# Patient Record
Sex: FEMALE | Race: WHITE | NOT HISPANIC OR LATINO | Employment: STUDENT | URBAN - METROPOLITAN AREA
[De-identification: names, ages, dates, MRNs, and addresses within clinical notes are randomized per-mention and may not be internally consistent; named-entity substitution may affect disease eponyms.]

---

## 2024-05-03 ENCOUNTER — OFFICE VISIT (OUTPATIENT)
Dept: FAMILY MEDICINE | Facility: CLINIC | Age: 24
End: 2024-05-03
Payer: COMMERCIAL

## 2024-05-03 ENCOUNTER — LAB (OUTPATIENT)
Dept: LAB | Facility: CLINIC | Age: 24
End: 2024-05-03
Payer: COMMERCIAL

## 2024-05-03 VITALS
HEIGHT: 61 IN | HEART RATE: 67 BPM | SYSTOLIC BLOOD PRESSURE: 119 MMHG | DIASTOLIC BLOOD PRESSURE: 78 MMHG | BODY MASS INDEX: 21.71 KG/M2 | WEIGHT: 115 LBS

## 2024-05-03 DIAGNOSIS — S83.004S PATELLAR DISLOCATION, RIGHT, SEQUELA: ICD-10-CM

## 2024-05-03 DIAGNOSIS — Z02.5 SPORTS PHYSICAL: Primary | ICD-10-CM

## 2024-05-03 DIAGNOSIS — N91.1 AMENORRHEA, SECONDARY: ICD-10-CM

## 2024-05-03 DIAGNOSIS — Z13.0 ENCOUNTER FOR SICKLE-CELL SCREENING: ICD-10-CM

## 2024-05-03 DIAGNOSIS — Z13.6 SCREENING FOR HEART DISEASE: ICD-10-CM

## 2024-05-03 PROCEDURE — 83020 HEMOGLOBIN ELECTROPHORESIS: CPT | Performed by: PREVENTIVE MEDICINE

## 2024-05-03 PROCEDURE — 36415 COLL VENOUS BLD VENIPUNCTURE: CPT | Performed by: PATHOLOGY

## 2024-05-03 PROCEDURE — 99000 SPECIMEN HANDLING OFFICE-LAB: CPT | Performed by: PATHOLOGY

## 2024-05-03 PROCEDURE — 93000 ELECTROCARDIOGRAM COMPLETE: CPT | Performed by: INTERNAL MEDICINE

## 2024-05-03 RX ORDER — NORETHINDRONE ACETATE AND ETHINYL ESTRADIOL AND FERROUS FUMARATE 5-7-9-7
1 KIT ORAL DAILY
COMMUNITY

## 2024-05-03 ASSESSMENT — ANXIETY QUESTIONNAIRES
3. WORRYING TOO MUCH ABOUT DIFFERENT THINGS: NOT AT ALL
7. FEELING AFRAID AS IF SOMETHING AWFUL MIGHT HAPPEN: NOT AT ALL
GAD7 TOTAL SCORE: 0
GAD7 TOTAL SCORE: 0
5. BEING SO RESTLESS THAT IT IS HARD TO SIT STILL: NOT AT ALL
2. NOT BEING ABLE TO STOP OR CONTROL WORRYING: NOT AT ALL
1. FEELING NERVOUS, ANXIOUS, OR ON EDGE: NOT AT ALL
6. BECOMING EASILY ANNOYED OR IRRITABLE: NOT AT ALL

## 2024-05-03 ASSESSMENT — PATIENT HEALTH QUESTIONNAIRE - PHQ9: 5. POOR APPETITE OR OVEREATING: NOT AT ALL

## 2024-05-03 NOTE — PROGRESS NOTES
"Dayana Rider presents for PPE for cheer  History of patellar dislocation in middle school of right knee?  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  Sport(s): Cheerleading    Vision: Right Eye: 20/20 Left Eye: 20/20 Both Eyes: 20/20  Correction: glasses and contacts  Pupils: equal    Sickle Cell Trait: Discussed  Concussions: Concussion fact sheet reviewed. Student Athlete gave written and verbal agreement to report any suspected concussions.    General/Medical  Eyes/Vision: Normal  Ears/Hearing: Normal  Nose: Normal  Mouth/Dental: Normal  Throat: Normal  Thyroid: Normal  Lymph Nodes: Normal  Lungs: Normal  Abdomen: Normal    Skin: Normal    Musculoskeletal/Orthopaedic  Neck/Cervical: Normal  Thoracic/Lumbar: Normal  Shoulder/Upper Arm: Normal  Elbow/Forearm: Normal  Wrist/Hand/Fingers: Normal  Hip/Thigh: Normal  Knee/Patella: Normal, except for crepitus, no pain  Lower Leg/Ankles: Normal  Foot/Toes: Normal    Cardiovascular Screening  RRR  Heart Murmur:No Grade: NA  Symmetric Femoral pulses: Yes    Stigmata of Marfan's Syndrome - if appropriate:  Not applicable    COMMENTS, RECOMMENDATIONS and PARTICIPATION STATUS  Cleared after completing evaluation/rehabilitation for: followup with OB/Gyn and then with us for review of lab studies  And I will order DEXA scan and bilateral knee xrays  Reviewed EKG, no concerns  Sickle cell negative  Discussed plan with athlete and ATC  Dr Robertson    "

## 2024-05-03 NOTE — LETTER
"  5/3/2024      RE: Dayana Rider  [address] [place] [address]     Dear Colleague,    Thank you for referring your patient, Dayana Rider, to the [facility]. Please see a copy of my visit note below.    Dayana Rider presents for PPE for cheer  History of patellar dislocation in middle school of right knee?  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  Sport(s): Cheerleading    Vision: Right Eye: 20/20 Left Eye: 20/20 Both Eyes: 20/20  Correction: glasses and contacts  Pupils: equal    Sickle Cell Trait: Discussed  Concussions: Concussion fact sheet reviewed. Student Athlete gave written and verbal agreement to report any suspected concussions.    General/Medical  Eyes/Vision: Normal  Ears/Hearing: Normal  Nose: Normal  Mouth/Dental: Normal  Throat: Normal  Thyroid: Normal  Lymph Nodes: Normal  Lungs: Normal  Abdomen: Normal    Skin: Normal    Musculoskeletal/Orthopaedic  Neck/Cervical: Normal  Thoracic/Lumbar: Normal  Shoulder/Upper Arm: Normal  Elbow/Forearm: Normal  Wrist/Hand/Fingers: Normal  Hip/Thigh: Normal  Knee/Patella: Normal, except for crepitus, no pain  Lower Leg/Ankles: Normal  Foot/Toes: Normal    Cardiovascular Screening  RRR  Heart Murmur:No Grade: NA  Symmetric Femoral pulses: Yes    Stigmata of Marfan's Syndrome - if appropriate:  Not applicable    COMMENTS, RECOMMENDATIONS and PARTICIPATION STATUS  Cleared after completing evaluation/rehabilitation for: followup with OB/Gyn and then with us for review of lab studies  And I will order DEXA scan and bilateral knee " xrays  Reviewed EKG, no concerns  Sickle cell negative  Discussed plan with athlete and ATC  Dr Robertson        Again, thank you for allowing me to participate in the care of your patient.      Sincerely,    Kieran Robertson MD

## 2024-05-06 LAB
ATRIAL RATE - MUSE: 58 BPM
DIASTOLIC BLOOD PRESSURE - MUSE: NORMAL MMHG
INTERPRETATION ECG - MUSE: NORMAL
P AXIS - MUSE: 52 DEGREES
PR INTERVAL - MUSE: 120 MS
QRS DURATION - MUSE: 80 MS
QT - MUSE: 452 MS
QTC - MUSE: 443 MS
R AXIS - MUSE: 57 DEGREES
SYSTOLIC BLOOD PRESSURE - MUSE: NORMAL MMHG
T AXIS - MUSE: 45 DEGREES
VENTRICULAR RATE- MUSE: 58 BPM

## 2024-05-07 LAB — HGB S BLD QL: NEGATIVE

## 2024-05-09 DIAGNOSIS — N91.2 AMENORRHEA: Primary | ICD-10-CM

## 2024-06-13 ENCOUNTER — ANCILLARY PROCEDURE (OUTPATIENT)
Dept: BONE DENSITY | Facility: CLINIC | Age: 24
End: 2024-06-13
Attending: PREVENTIVE MEDICINE
Payer: COMMERCIAL

## 2024-06-13 DIAGNOSIS — N91.2 AMENORRHEA: ICD-10-CM

## 2024-06-13 PROCEDURE — 77080 DXA BONE DENSITY AXIAL: CPT | Performed by: INTERNAL MEDICINE

## 2024-06-14 ENCOUNTER — OFFICE VISIT (OUTPATIENT)
Dept: FAMILY MEDICINE | Facility: CLINIC | Age: 24
End: 2024-06-14
Payer: COMMERCIAL

## 2024-06-14 VITALS
HEART RATE: 70 BPM | DIASTOLIC BLOOD PRESSURE: 71 MMHG | HEIGHT: 61 IN | BODY MASS INDEX: 21.28 KG/M2 | WEIGHT: 112.7 LBS | SYSTOLIC BLOOD PRESSURE: 108 MMHG

## 2024-06-14 DIAGNOSIS — N91.1 AMENORRHEA, SECONDARY: Primary | ICD-10-CM

## 2024-06-14 RX ORDER — NORETHINDRONE ACETATE AND ETHINYL ESTRADIOL 1MG-20(21)
1 KIT ORAL DAILY
COMMUNITY

## 2024-06-14 ASSESSMENT — ANXIETY QUESTIONNAIRES
5. BEING SO RESTLESS THAT IT IS HARD TO SIT STILL: NOT AT ALL
3. WORRYING TOO MUCH ABOUT DIFFERENT THINGS: NOT AT ALL
1. FEELING NERVOUS, ANXIOUS, OR ON EDGE: NOT AT ALL
GAD7 TOTAL SCORE: 0
2. NOT BEING ABLE TO STOP OR CONTROL WORRYING: NOT AT ALL
6. BECOMING EASILY ANNOYED OR IRRITABLE: NOT AT ALL
7. FEELING AFRAID AS IF SOMETHING AWFUL MIGHT HAPPEN: NOT AT ALL
GAD7 TOTAL SCORE: 0

## 2024-06-14 ASSESSMENT — PATIENT HEALTH QUESTIONNAIRE - PHQ9: 5. POOR APPETITE OR OVEREATING: NOT AT ALL

## 2024-06-14 NOTE — PROGRESS NOTES
HISTORY OF PRESENT ILLNESS  Ms. Rider is a pleasant 24 year old year old female who presents to clinic today with the following:  What problem are you here for?  Followup for DEXA scan  Feels well  Saw her Ob/GYn provider back home and reviewed her use of OCPs and as she is on continuous OCPs amenorrhea would be expected  No further labs or testing recommended        MEDICAL HISTORY  There is no problem list on file for this patient.      Current Outpatient Medications   Medication Sig Dispense Refill    norethindrone-ethinyl estradiol (JUNEL FE 1/20) 1-20 MG-MCG tablet Take 1 tablet by mouth daily      norethindrone-ethinyl estradiol-iron (ESTROSTEP FE) 1-20/1-30/1-35 MG-MCG tablet Take 1 tablet by mouth daily (Patient not taking: Reported on 6/14/2024)         No Known Allergies    No family history on file.  Social History     Socioeconomic History    Marital status: Single     Social Determinants of Health     Financial Resource Strain: Low Risk  (5/20/2024)    Received from [facility]    Financial Resource Strain     In the past year, have you or any family members you live with been unable to get any of the following when it was really needed? : No   Transportation Needs: Low Risk  (5/20/2024)    Received from [facility]    Transportation Needs     Has lack of transportation kept you from medical appointments, meetings, work, or from getting things needed for daily living? : No   Social Connections: Low Risk  (5/20/2024)    Received from [facility]    Social Connections     How often do you see or talk to people that you care about and feel close to? (For example: talking to friends on the phone, visiting friends or family, going to S[employer] or club meetings): 5 or more times a week   Interpersonal Safety: Low Risk  (6/14/2024)    Interpersonal Safety     Do you feel physically and emotionally safe where you currently live?: Yes     Within  the past 12 months, have you been hit, slapped, kicked or otherwise physically hurt by someone?: No     Within the past 12 months, have you been humiliated or emotionally abused in other ways by your partner or ex-partner?: No   Housing Stability: Low Risk  (5/20/2024)    Received from [facility]    Housing Stability     What is your housing situation?: I have housing.       Additional medical/Social/Surgical histories reviewed in [facility] and updated as appropriate.     REVIEW OF SYSTEMS (6/14/2024)  10 point ROS of systems including Constitutional, Eyes, Respiratory, Cardiovascular, Gastroenterology, Genitourinary, Integumentary, Musculoskeletal, Psychiatric, Allergic/Immunologic were all negative except for pertinent positives noted in my HPI.     PHYSICAL EXAM  VSS  Exam:  Constitutional: healthy, alert, and no distress  Head: Normocephalic. No masses, lesions, tenderness or abnormalities  Neck: Neck supple. No adenopathy. Thyroid symmetric, normal size,, Carotids without bruits.  ENT: ENT exam normal, no neck nodes or sinus tenderness  Cardiovascular: negative, PMI normal. No lifts, heaves, or thrills. RRR. No murmurs, clicks gallops or rub  Respiratory: negative, Percussion normal. Good diaphragmatic excursion. Lungs clear  Gastrointestinal: Abdomen soft, non-tender. BS normal. No masses, organomegaly    Skin: no suspicious lesions or rashes  Neurologic: Gait normal. Reflexes normal and symmetric. Sensation grossly WNL.  Psychiatric: mentation appears normal and affect normal/bright  Hematologic/Lymphatic/Immunologic: Normal cervical lymph nodes    ASSESSMENT & PLAN  23 yo female with history of continuous use OCP and no menstrual cycle x 4 years, stable    I independently reviewed the following imaging studies:  DEXA: normal for age  Cont. OCPs per Ob/Gyn referral note- see chart  No concerns at this time  Followup PRN        Appropriate PPE was utilized for prevention of spread of  Covid-19.  Kieran Robertson MD, CAM

## 2024-06-14 NOTE — LETTER
6/14/2024      RE: Dayana Rider  [address] [place] [address]     Dear Colleague,    Thank you for referring your patient, Dayana Rider, to the  [facility]. Please see a copy of my visit note below.    HISTORY OF PRESENT ILLNESS  Ms. Rider is a pleasant 24 year old year old female who presents to clinic today with the following:  What problem are you here for?  Followup for DEXA scan  Feels well  Saw her Ob/GYn provider back home and reviewed her use of OCPs and as she is on continuous OCPs amenorrhea would be expected  No further labs or testing recommended        MEDICAL HISTORY  There is no problem list on file for this patient.      Current Outpatient Medications   Medication Sig Dispense Refill     norethindrone-ethinyl estradiol (JUNEL FE 1/20) 1-20 MG-MCG tablet Take 1 tablet by mouth daily       norethindrone-ethinyl estradiol-iron (ESTROSTEP FE) 1-20/1-30/1-35 MG-MCG tablet Take 1 tablet by mouth daily (Patient not taking: Reported on 6/14/2024)         No Known Allergies    No family history on file.  Social History     Socioeconomic History     Marital status: Single     Social Determinants of Health     Financial Resource Strain: Low Risk  (5/20/2024)    Received from [facility]    Financial Resource Strain      In the past year, have you or any family members you live with been unable to get any of the following when it was really needed? : No   Transportation Needs: Low Risk  (5/20/2024)    Received from [facility]    Transportation Needs      Has lack of transportation kept you from medical appointments, meetings, work, or from getting things needed for daily living? : No   Social Connections: Low Risk  (5/20/2024)    Received from [facility]    Social Connections      How often do you see or talk to people that you care about and feel close to? (For example: talking to friends on  the phone, visiting friends or family, going to [employer] or club meetings): 5 or more times a week   Interpersonal Safety: Low Risk  (6/14/2024)    Interpersonal Safety      Do you feel physically and emotionally safe where you currently live?: Yes      Within the past 12 months, have you been hit, slapped, kicked or otherwise physically hurt by someone?: No      Within the past 12 months, have you been humiliated or emotionally abused in other ways by your partner or ex-partner?: No   Housing Stability: Low Risk  (5/20/2024)    Received from [facility]    Housing Stability      What is your housing situation?: I have housing.       Additional medical/Social/Surgical histories reviewed in [facility] and updated as appropriate.     REVIEW OF SYSTEMS (6/14/2024)  10 point ROS of systems including Constitutional, Eyes, Respiratory, Cardiovascular, Gastroenterology, Genitourinary, Integumentary, Musculoskeletal, Psychiatric, Allergic/Immunologic were all negative except for pertinent positives noted in my HPI.     PHYSICAL EXAM  VSS  Exam:  Constitutional: healthy, alert, and no distress  Head: Normocephalic. No masses, lesions, tenderness or abnormalities  Neck: Neck supple. No adenopathy. Thyroid symmetric, normal size,, Carotids without bruits.  ENT: ENT exam normal, no neck nodes or sinus tenderness  Cardiovascular: negative, PMI normal. No lifts, heaves, or thrills. RRR. No murmurs, clicks gallops or rub  Respiratory: negative, Percussion normal. Good diaphragmatic excursion. Lungs clear  Gastrointestinal: Abdomen soft, non-tender. BS normal. No masses, organomegaly    Skin: no suspicious lesions or rashes  Neurologic: Gait normal. Reflexes normal and symmetric. Sensation grossly WNL.  Psychiatric: mentation appears normal and affect normal/bright  Hematologic/Lymphatic/Immunologic: Normal cervical lymph nodes    ASSESSMENT & PLAN  25 yo female with history of continuous use OCP and no menstrual  cycle x 4 years, stable    I independently reviewed the following imaging studies:  DEXA: normal for age  Cont. OCPs per Ob/Gyn referral note- see chart  No concerns at this time  Followup PRN        Appropriate PPE was utilized for prevention of spread of Covid-19.  Kieran Robertson MD, CAQSM        Again, thank you for allowing me to participate in the care of your patient.      Sincerely,    Kieran Robertson MD

## 2024-10-22 ENCOUNTER — OFFICE VISIT (OUTPATIENT)
Dept: FAMILY MEDICINE | Facility: CLINIC | Age: 24
End: 2024-10-22
Payer: COMMERCIAL

## 2024-10-22 VITALS
WEIGHT: 112 LBS | BODY MASS INDEX: 21.14 KG/M2 | DIASTOLIC BLOOD PRESSURE: 79 MMHG | SYSTOLIC BLOOD PRESSURE: 121 MMHG | HEART RATE: 77 BPM | HEIGHT: 61 IN

## 2024-10-22 DIAGNOSIS — S99.912A LEFT ANKLE INJURY, INITIAL ENCOUNTER: Primary | ICD-10-CM

## 2024-10-22 ASSESSMENT — ANXIETY QUESTIONNAIRES
3. WORRYING TOO MUCH ABOUT DIFFERENT THINGS: NOT AT ALL
1. FEELING NERVOUS, ANXIOUS, OR ON EDGE: NOT AT ALL
5. BEING SO RESTLESS THAT IT IS HARD TO SIT STILL: NOT AT ALL
7. FEELING AFRAID AS IF SOMETHING AWFUL MIGHT HAPPEN: NOT AT ALL
6. BECOMING EASILY ANNOYED OR IRRITABLE: NOT AT ALL
GAD7 TOTAL SCORE: 0
GAD7 TOTAL SCORE: 0
2. NOT BEING ABLE TO STOP OR CONTROL WORRYING: NOT AT ALL

## 2024-10-22 ASSESSMENT — PATIENT HEALTH QUESTIONNAIRE - PHQ9
5. POOR APPETITE OR OVEREATING: NOT AT ALL
SUM OF ALL RESPONSES TO PHQ QUESTIONS 1-9: 0

## 2024-10-22 NOTE — PROGRESS NOTES
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   -No imaging   -Icing it  -Taking ibuprofen  -No previous injuries to that ankle    Current Outpatient Medications   Medication Sig Dispense Refill    norethindrone-ethinyl estradiol (JUNEL FE 1/20) 1-20 MG-MCG tablet Take 1 tablet by mouth daily      norethindrone-ethinyl estradiol-iron (ESTROSTEP FE) 1-20/1-30/1-35 MG-MCG tablet Take 1 tablet by mouth daily (Patient not taking: Reported on 6/14/2024)       No current facility-administered medications for this visit.     O:  NAD  Wearing a walking boot and tubigrip  L ankle:  Marked brusing around the ankle joint and lateral foot and dorsal foot.   +ttp medial and lateral malleolus distally.  Nttp over the mid to proximal fibula  +ttp over the ant inf tib fib ligament and approx 5 cm above the ligament over the syndesmosis  Neg external rotation and squeeze test  +anterior drawer test with laxity compared to the RIGHT  5/5 strength of the ankle  Mild jt effusion  + NVI  +ttp over the tibiotalar jt anteriorly   Nttp over the extensor tendons and no pain with resisted extension of the toes  Nttp over the ATFL and CFL  Mild ttp over the PTFL vs peroneal tendons  Nttp over the navicular, cuboid and base of the 5th metatarsal  +ttp over the deltoid ligament area      A:  L ankle high ankle sprain and possible fibular fracture with a mild joint effusion     P:  Recommend continuing the walking boot with WB as tolerated in the boot  Compression  Ice  Ok with OTC meds for pain management  Recommend ankle x-rays weight bearing  Will review results with AT    Lisa Aleman ATC was present for the entire appt and   Ashley Montez, PGY3 was also present    Ankle xrays 3 views reviewed:  Neg for fracture or mortise widening on wt bearing xrays.     AT notified and she will inform the patient.  Continue the current treatment plan.     Rachel Butler MD, CAQ, FACSM, CCD  [facility]  Sports Medicine and Bone Health  Team Physician;  Athletics

## 2024-10-23 ENCOUNTER — ANCILLARY PROCEDURE (OUTPATIENT)
Dept: GENERAL RADIOLOGY | Facility: CLINIC | Age: 24
End: 2024-10-23
Attending: FAMILY MEDICINE
Payer: COMMERCIAL

## 2024-10-23 DIAGNOSIS — S99.912A LEFT ANKLE INJURY, INITIAL ENCOUNTER: ICD-10-CM

## 2024-10-23 PROCEDURE — 73610 X-RAY EXAM OF ANKLE: CPT | Mod: LT | Performed by: RADIOLOGY

## 2024-11-05 ENCOUNTER — OFFICE VISIT (OUTPATIENT)
Dept: FAMILY MEDICINE | Facility: CLINIC | Age: 24
End: 2024-11-05
Payer: COMMERCIAL

## 2024-11-05 VITALS
HEIGHT: 61 IN | WEIGHT: 112 LBS | BODY MASS INDEX: 21.14 KG/M2 | DIASTOLIC BLOOD PRESSURE: 72 MMHG | SYSTOLIC BLOOD PRESSURE: 112 MMHG | HEART RATE: 60 BPM

## 2024-11-05 DIAGNOSIS — S99.912D LEFT ANKLE INJURY, SUBSEQUENT ENCOUNTER: Primary | ICD-10-CM

## 2024-11-05 ASSESSMENT — ANXIETY QUESTIONNAIRES
GAD7 TOTAL SCORE: 0
5. BEING SO RESTLESS THAT IT IS HARD TO SIT STILL: NOT AT ALL
7. FEELING AFRAID AS IF SOMETHING AWFUL MIGHT HAPPEN: NOT AT ALL
GAD7 TOTAL SCORE: 0
3. WORRYING TOO MUCH ABOUT DIFFERENT THINGS: NOT AT ALL
2. NOT BEING ABLE TO STOP OR CONTROL WORRYING: NOT AT ALL
1. FEELING NERVOUS, ANXIOUS, OR ON EDGE: NOT AT ALL
6. BECOMING EASILY ANNOYED OR IRRITABLE: NOT AT ALL

## 2024-11-05 ASSESSMENT — PATIENT HEALTH QUESTIONNAIRE - PHQ9
5. POOR APPETITE OR OVEREATING: NOT AT ALL
SUM OF ALL RESPONSES TO PHQ QUESTIONS 1-9: 0

## 2024-11-05 NOTE — PROGRESS NOTES
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   -Doing rehab      BACKGROUND:   -L ankle injury on Thursday night last week while at [facility]  -She was tumbling and doing a full twist and doesn't really recall what when wrong and she landed sitting and leaning to the RIGHT  -It was painful immediately  -+Swelling and bruising  -Started in the walking boot on Friday after seeing Lisa, her AT.   -No imaging   -Icing it  -Taking ibuprofen  -No previous injuries to that ankle     Current Facility-Administered Medications          Current Outpatient Medications   Medication Sig Dispense Refill    norethindrone-ethinyl estradiol (JUNEL FE 1/20) 1-20 MG-MCG tablet Take 1 tablet by mouth daily        norethindrone-ethinyl estradiol-iron (ESTROSTEP FE) 1-20/1-30/1-35 MG-MCG tablet Take 1 tablet by mouth daily (Patient not taking: Reported on 6/14/2024)          No current facility-administered medications for this visit.         O:  NAD  Wearing tennis shoes and walking without a limp  L ankle:  Bruising resolved.  Mild tibiotalar effusion  Slight ttp medial and lateral malleolus distally.  Nttp over the mid to proximal fibula  +ttp over the ant inf tib fib ligament and approx 5 cm above the ligament over the syndesmosis  Neg external rotation and squeeze test  +anterior drawer test with laxity compared to the RIGHT  5/5 strength of the ankle  Mild jt effusion  + NVI  +ttp over the tibiotalar jt anteriorly     Nttp over the navicular, cuboid and base of the 5th metatarsal  Mild ttp over the deltoid ligament area        A:  L ankle high ankle sprain and possible bone contusion of the medial talus and/o tibia resulting in a mild joint effusion:  Making excellent progress since her last visit.      P:  Advance rehab as  tolerated.   Monitor ankle jt effusion and if not resolving or worsening with increased activity get a MRI of her ankle. I have placed the order for the MRI if needed.   Continue Compression and Ice  Ok with OTC meds for pain management  Reviewed ankle x-rays weight bearing with the patient.    Ok to cheer this weekend at the [employer] team game at [employer] with her ankle taped or braced and she will avoid tumbling, jumps, stunting.   Follow-up prn or if she needs the MRI then I would want to see her back after that imaging.      Lisa Aleman ATC was present for the entire appt         Rachel Butler MD, CAQ, FACSM, CCD  [facility]  Sports Medicine and Bone Health  Team Physician;  Athletics